# Patient Record
Sex: MALE | HISPANIC OR LATINO
[De-identification: names, ages, dates, MRNs, and addresses within clinical notes are randomized per-mention and may not be internally consistent; named-entity substitution may affect disease eponyms.]

---

## 2021-11-03 PROBLEM — Z00.00 ENCOUNTER FOR PREVENTIVE HEALTH EXAMINATION: Status: ACTIVE | Noted: 2021-11-03

## 2021-11-10 ENCOUNTER — APPOINTMENT (OUTPATIENT)
Dept: UROLOGY | Facility: CLINIC | Age: 66
End: 2021-11-10
Payer: MEDICARE

## 2021-11-10 PROCEDURE — 99204 OFFICE O/P NEW MOD 45 MIN: CPT

## 2021-11-10 NOTE — HISTORY OF PRESENT ILLNESS
[FreeTextEntry1] : This is a 65 year male who has history of diabetes presents for urgency and nocturia\par and erectile dysfunction\par \par Vardenafil for ED -- has already tried viagra and cialis with mixed results \par \par Nov 2021=\par  IPSS -- 7 = mild symptoms\par IIEF = 7\par \par new results\par OCt 2021\par Testosterone -- 328\par A1c - 6.1\par PSA 0.67

## 2021-11-18 LAB
APPEARANCE: CLEAR
BACTERIA UR CULT: NORMAL
BILIRUBIN URINE: NEGATIVE
BLOOD URINE: NEGATIVE
COLOR: YELLOW
GLUCOSE QUALITATIVE U: ABNORMAL
KETONES URINE: NEGATIVE
LEUKOCYTE ESTERASE URINE: NEGATIVE
NITRITE URINE: NEGATIVE
PH URINE: 5.5
PROTEIN URINE: NORMAL
SPECIFIC GRAVITY URINE: 1.02
UROBILINOGEN URINE: NORMAL

## 2022-01-04 ENCOUNTER — NON-APPOINTMENT (OUTPATIENT)
Age: 67
End: 2022-01-04

## 2022-01-12 ENCOUNTER — APPOINTMENT (OUTPATIENT)
Dept: UROLOGY | Facility: CLINIC | Age: 67
End: 2022-01-12
Payer: MEDICARE

## 2022-01-12 VITALS — WEIGHT: 271 LBS | HEIGHT: 72 IN | BODY MASS INDEX: 36.7 KG/M2

## 2022-01-12 PROCEDURE — 99213 OFFICE O/P EST LOW 20 MIN: CPT

## 2022-01-12 RX ORDER — VARDENAFIL 20 MG/1
20 TABLET, FILM COATED ORAL
Qty: 15 | Refills: 5 | Status: ACTIVE | COMMUNITY
Start: 2022-01-12 | End: 1900-01-01

## 2022-01-12 NOTE — HISTORY OF PRESENT ILLNESS
[FreeTextEntry1] : 66 year old male who has history of diabetes presents for urgency and nocturia and erectile dysfunction. \par \par Patient reports that his ED has improved with Levitra since his recent Hernia Surgery in 11/2021. Patient was to trial Trimix at today's visit but declines as the pills are working "moderately well." \par \par Urinary symptoms include Urgency. Patient Urine Culture done 11/2021 reveals no growth. Patient had a bladder US done 01/2022 which revealed a 24 g prostate, prevoid 899 cc, and PVR of 143 cc. \par On Ultrasound there is also 2 fluid collections seen in pelvis, biggest being 9 cm in the RLQ. A CT scan was ordered and patient is waiting for authorization to scheduled CT scan. Patient denies any history of fevers. \par \par PSA 10/2021- 0.67 ng/mL\par Total T 10/2021- 328 ng/dL

## 2022-01-12 NOTE — PHYSICAL EXAM
[General Appearance - In No Acute Distress] : no acute distress [Abdomen Soft] : soft [Abdomen Tenderness] : non-tender [] : no respiratory distress [Oriented To Time, Place, And Person] : oriented to person, place, and time [Normal Station and Gait] : the gait and station were normal for the patient's age [FreeTextEntry1] : No fluid collections palpated.

## 2022-01-12 NOTE — ASSESSMENT
[FreeTextEntry1] : Patient 65 year old followed for ED and Urinary Urgency. \par Patient wishes to continue Vardenafil. Denies history of heart attack. Denies taking any nitroglycerine medications. \par Declined trial of Trimix this visit. \par \par As for Urinary Urgency, patient does not want to take any medication for urinary symptoms at this time. I reviewed US with patient and he is aware he left over 100 cc of urine in his bladder. Patient is also aware of fluid collections and the importance of CT scan to differentiate. \par \par Patient is aware of Testosterone value. He is aware of risks, benefits to testosterone replacement. Given value and T in 10/2021 will hold off on testosterone therapy. Patient will increase exercise and follow up in 1 year with repeat blood work. \par \par Plan\par -Follow up 3-4 weeks to review CT scan results. \par -Follow up 1 year with PSA and Total Testosterone \par -Renew Vardenafil \par

## 2022-02-14 ENCOUNTER — APPOINTMENT (OUTPATIENT)
Dept: UROLOGY | Facility: CLINIC | Age: 67
End: 2022-02-14
Payer: MEDICARE

## 2022-02-14 DIAGNOSIS — R93.89 ABNORMAL FINDINGS ON DIAGNOSTIC IMAGING OF OTHER SPECIFIED BODY STRUCTURES: ICD-10-CM

## 2022-02-14 PROCEDURE — 99214 OFFICE O/P EST MOD 30 MIN: CPT | Mod: 95

## 2022-02-14 NOTE — HISTORY OF PRESENT ILLNESS
[FreeTextEntry1] : TELEMEDICINE -- [facility] FOR FOLLOW UP APPOINTMENT\par \par The patient-doctor relationship has been established in a face to face fashion via real time video/audio HIPAA compliant communication using telemedicine software. The patient's identity has been confirmed. The patient was previously emailed a copy of the telemedicine consent. They have had a chance to review and has now given verbal consent and has requested care to be assessed and treated through telemedicine and understands there maybe limitations in this process and they may need further follow up care in the office and or hospital settings.\par \par The patient denies fevers, chills, nausea and or vomiting and no unexplained weight loss.\par \par All pertinent parts of the patient PFSH (past medical, family and social histories), laboratory, radiological studies and physician notes were reviewed prior to starting the face to face portion of the telemedicine visit. Questionnaire results were discussed with patient.\par \par ==================================================================================================== \par \par Patient 66 year old followed for ED and Urinary Urgency. \par Patient wishes to continue Vardenafil. Denies history of heart attack. Denies taking any nitroglycerine medications. \par Declined trial of Trimix this visit. \par \par As for Urinary Urgency, patient does not want to take any medication for urinary symptoms at this time. I reviewed US with patient and he is aware he left over 100 cc of urine in his bladder. Patient is also aware of fluid collections and the importance of CT scan to differentiate -- hernia surgery Nov 2021\par \par Patient is aware of Testosterone value. He is aware of risks, benefits to testosterone replacement. Given value and T in 10/2021 will hold off on testosterone therapy. Patient will increase exercise and follow up in 1 year with repeat blood work. \par \par -Follow up 1 year with PSA and Total Testosterone \par \par Started: Vardenafil HCl - 20 MG Oral Tablet; - reports not working very well\par \par new labs since last visit\par Jan 2022\par CT -- bilateral inguinal collections -- ?related to hernia operation\par \par Oct 2021\par Testosterone -- 328\par PSA -0.67

## 2022-02-14 NOTE — ASSESSMENT
[FreeTextEntry1] : \par Patient 66 year old followed for ED and Urinary Urgency. \par Patient wishes to continue Vardenafil. Denies history of heart attack. Denies taking any nitroglycerine medications. \par Declined trial of Trimix this visit. \par \par As for Urinary Urgency, patient does not want to take any medication for urinary symptoms at this time. I reviewed US with patient and he is aware he left over 100 cc of urine in his bladder. Patient is also aware of fluid collections and the importance of CT scan to differentiate -- hernia surgery Nov 2021\par \par Patient is aware of Testosterone value. He is aware of risks, benefits to testosterone replacement. Given value and T in 10/2021 will hold off on testosterone therapy. Patient will increase exercise and follow up in 1 year with repeat blood work. \par \par -Follow up 1 year with PSA and Total Testosterone \par \par Started: Vardenafil HCl - 20 MG Oral Tablet; - reports not working very well\par \par new labs since last visit\par Jan 2022\par CT -- bilateral inguinal collections -- ?related to hernia operation\par \par Oct 2021\par Testosterone -- 328\par PSA -0.67

## 2023-01-18 ENCOUNTER — APPOINTMENT (OUTPATIENT)
Dept: UROLOGY | Facility: CLINIC | Age: 68
End: 2023-01-18

## 2023-02-15 ENCOUNTER — APPOINTMENT (OUTPATIENT)
Dept: UROLOGY | Facility: CLINIC | Age: 68
End: 2023-02-15
Payer: MEDICARE

## 2023-02-15 VITALS
TEMPERATURE: 98 F | HEIGHT: 72 IN | BODY MASS INDEX: 36.7 KG/M2 | DIASTOLIC BLOOD PRESSURE: 83 MMHG | WEIGHT: 271 LBS | SYSTOLIC BLOOD PRESSURE: 127 MMHG

## 2023-02-15 PROCEDURE — 99213 OFFICE O/P EST LOW 20 MIN: CPT

## 2023-02-15 RX ORDER — PAPAVERINE HYDROCHLORIDE 30 MG/ML
30 INJECTION, SOLUTION INTRAVENOUS
Qty: 0.5 | Refills: 0 | Status: ACTIVE | COMMUNITY
Start: 2023-02-15 | End: 1900-01-01

## 2023-02-15 NOTE — ASSESSMENT
[FreeTextEntry1] : Patient is a 67-year-old followed for erectile dysfunction and urinary urgency.\par \par Erectile dysfunction is refractory to oral medications and patient requested trial of Trimix.\par \par Urinary symptoms are stable.  Patient has no complaints at this visit.\par \par PSA in 2021 was 0.67.  He is due for repeat PSA.\par \par Plan\par -Follow-up 4 to 6 weeks for Trimix trial\par -PSA prior

## 2023-02-15 NOTE — HISTORY OF PRESENT ILLNESS
[FreeTextEntry1] : Kai is a 67-year-old with history of erectile dysfunction and urinary urgency.\par He is currently on vardenafil for ED without improvement in erections.  Patient request to schedule a visit to trial Trimix.\par \par Patient states that since his last visit his urinary symptoms have been stable.  He denies any difficulties urinating.  He denies dysuria and gross hematuria.\par \par He has history of bladder sonogram which showed PVR of 100 cc and pelvic fluid collections.  Follow-up CT scan revealed bilateral inguinal conductions which were possibly related to previous hernia procedure.\par \par PSA in 2021 was 0.67 ng/mL\par Total testosterone in 2021 was 328.

## 2023-03-22 ENCOUNTER — APPOINTMENT (OUTPATIENT)
Dept: UROLOGY | Facility: CLINIC | Age: 68
End: 2023-03-22
Payer: MEDICARE

## 2023-03-22 VITALS
BODY MASS INDEX: 36.7 KG/M2 | TEMPERATURE: 97.5 F | HEART RATE: 75 BPM | WEIGHT: 271 LBS | OXYGEN SATURATION: 98 % | HEIGHT: 72 IN | RESPIRATION RATE: 16 BRPM | SYSTOLIC BLOOD PRESSURE: 125 MMHG | DIASTOLIC BLOOD PRESSURE: 80 MMHG

## 2023-03-22 DIAGNOSIS — R35.0 FREQUENCY OF MICTURITION: ICD-10-CM

## 2023-03-22 DIAGNOSIS — R35.1 NOCTURIA: ICD-10-CM

## 2023-03-22 PROCEDURE — 99215 OFFICE O/P EST HI 40 MIN: CPT

## 2023-03-31 PROBLEM — R35.0 URINE FREQUENCY: Status: ACTIVE | Noted: 2021-11-10

## 2023-03-31 PROBLEM — R35.1 NOCTURIA: Status: ACTIVE | Noted: 2021-11-10

## 2023-03-31 NOTE — ASSESSMENT
[FreeTextEntry1] : Kai is a 67-year-old history of erectile dysfunction and urinary urgency.\par Urinary symptoms have improved since being started on tadalafil 5 mg daily with primary medical doctor.\par PSA returned at 0.8 ng/mL compared to 0.67 ng/mL in 2021.\par \par He presents today for Trimix trial.\par Patient educated on how to properly prepare medication, repair skin, and inject intracavernosal.\par Risks of Trimix were reviewed with patient including priapism, buildup of scar tissue, and bleeding.\par Patient verbalized understanding of risks and wishes to proceed.\par \par Patient injected himself with 0.2 mL of Trimix 30 – 1 – 10 right-sided intracavernosal.  Patient tolerated injection well.  Skin prepped prior to injection and pressure held after injection.\par 15 minutes after injection, patient had significant fullness of the penis.  No firm erection.\par \par After discussion, patient would like to try injections at home.  He is to start with 0.2 mL of Trimix 30 – 1 – 10 intracavernosal.  Patient knows to alternate sides of injection and to not inject more than once every 3 days due to risk of scar tissue formation.  Patient knows to increase dose by 0.05 mL until satisfied with results.  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

## 2023-03-31 NOTE — HISTORY OF PRESENT ILLNESS
[FreeTextEntry1] : Kai is a 67-year-old with history of erectile dysfunction and urinary urgency.\par He was on vardenafil for erectile dysfunction without improvement.  He presents to office today to trial Trimix.\par \par Since his last visit his medical doctor has started him on daily Cialis 5 mg for lower urinary tract symptoms.  Patient states that this has significantly improved his flow and he is satisfied with this medication.  He denies any dysuria and gross hematuria.\par \par Patient recently had PSA done March 20, 2023.  Result as 0.8 ng/mL with 50% free.\par \par He has history of bladder sonogram which showed PVR of 100 cc and pelvic fluid collections. Follow-up CT scan revealed bilateral inguinal conductions which were possibly related to previous hernia procedure.

## 2023-03-31 NOTE — PHYSICAL EXAM
[General Appearance - In No Acute Distress] : no acute distress [] : no respiratory distress [Oriented To Time, Place, And Person] : oriented to person, place, and time [Normal Station and Gait] : the gait and station were normal for the patient's age [No Focal Deficits] : no focal deficits [FreeTextEntry1] : Psoriatic rash on glans of penis.

## 2023-03-31 NOTE — REVIEW OF SYSTEMS
[see HPI] : see HPI [Chills] : no chills [Fever] : no fever [Chest Pain] : no chest pain [Abdominal Pain] : no abdominal pain [Shortness Of Breath] : no shortness of breath [Vomiting] : no vomiting

## 2023-05-24 ENCOUNTER — APPOINTMENT (OUTPATIENT)
Dept: UROLOGY | Facility: CLINIC | Age: 68
End: 2023-05-24
Payer: MEDICARE

## 2023-05-24 VITALS
DIASTOLIC BLOOD PRESSURE: 71 MMHG | WEIGHT: 269.06 LBS | SYSTOLIC BLOOD PRESSURE: 121 MMHG | HEIGHT: 72 IN | TEMPERATURE: 98 F | BODY MASS INDEX: 36.44 KG/M2 | HEART RATE: 65 BPM

## 2023-05-24 PROCEDURE — 99213 OFFICE O/P EST LOW 20 MIN: CPT

## 2023-05-24 NOTE — HISTORY OF PRESENT ILLNESS
[FreeTextEntry1] : \par Kai is a 67-year-old history of erectile dysfunction and urinary urgency.\par Urinary symptoms have improved since being started on tadalafil 5 mg daily with primary medical doctor.\par PSA returned at 0.8 ng/mL in March 2023 compared to 0.67 ng/mL in 2021.\par \par Patient injected himself with 0.2 mL of Trimix 30 – 1 – 10 right-sided intracavernosal. Patient tolerated injection well. Skin prepped prior to injection and pressure held after injection.\par 15 minutes after injection, patient had significant fullness of the penis. No firm erection.\par \par very happy with trimix at home on 0.25\par \par flow is better on tadalafil 5mg daily but pmd\par \par ermatologist for psoriasis of the penis.  this has now resolved \par

## 2024-05-29 ENCOUNTER — APPOINTMENT (OUTPATIENT)
Dept: UROLOGY | Facility: CLINIC | Age: 69
End: 2024-05-29

## 2024-05-31 ENCOUNTER — APPOINTMENT (OUTPATIENT)
Dept: UROLOGY | Facility: CLINIC | Age: 69
End: 2024-05-31
Payer: MEDICARE

## 2024-05-31 VITALS
OXYGEN SATURATION: 97 % | HEIGHT: 72 IN | DIASTOLIC BLOOD PRESSURE: 75 MMHG | BODY MASS INDEX: 36.03 KG/M2 | WEIGHT: 266 LBS | HEART RATE: 69 BPM | RESPIRATION RATE: 18 BRPM | SYSTOLIC BLOOD PRESSURE: 148 MMHG

## 2024-05-31 DIAGNOSIS — N52.9 MALE ERECTILE DYSFUNCTION, UNSPECIFIED: ICD-10-CM

## 2024-05-31 DIAGNOSIS — Z12.5 ENCOUNTER FOR SCREENING FOR MALIGNANT NEOPLASM OF PROSTATE: ICD-10-CM

## 2024-05-31 PROCEDURE — G2211 COMPLEX E/M VISIT ADD ON: CPT

## 2024-05-31 PROCEDURE — 99213 OFFICE O/P EST LOW 20 MIN: CPT

## 2024-05-31 RX ORDER — PAPAVERINE HYDROCHLORIDE 30 MG/ML
30 INJECTION, SOLUTION INTRAVENOUS
Qty: 5 | Refills: 5 | Status: ACTIVE | COMMUNITY
Start: 2023-03-22 | End: 1900-01-01

## 2024-05-31 RX ORDER — DAPAGLIFLOZIN 5 MG/1
5 TABLET, FILM COATED ORAL
Refills: 0 | Status: ACTIVE | COMMUNITY

## 2024-05-31 NOTE — HISTORY OF PRESENT ILLNESS
[FreeTextEntry1] : Kai is a 67-year-old history of erectile dysfunction and urinary urgency. Urinary symptoms have improved since being started on tadalafil 5 mg daily with primary medical doctor.  PSA returned at 0.8 ng/mL in March 2023 compared to 0.67 ng/mL in 2021.  Patient injected himself with 0.2 mL of Trimix 30 - 1 - 10 right-sided intracavernosal. Patient tolerated injection well. Skin prepped prior to injection and pressure held after injection. 15 minutes after injection, patient had significant fullness of the penis. No firm erection.  very happy with trimix at home on 0.25 flow is better on tadalafil 5mg daily but pmd  Dermatologist for psoriasis of the penis. this has now resolved  Office visit 5/31/24 Patient presents today with no new acute complaints.  He has been taking Trimix 0.25 cc.  He is satisfied with his erectile function at this dose.  He is also taking tadalafil 5 mg daily for his lower urinary tract symptoms as prescribed by his primary medical provider.  The patient denies having any fevers, chills, nausea, vomiting, flank/abdominal pain or any irritative voiding symptoms.

## 2024-05-31 NOTE — PLAN

## 2024-05-31 NOTE — ASSESSMENT
[FreeTextEntry1] : #PSA screening - PSA 0.8 3/2023 - PSA  today Today I discussed the risks and benefits of PSA screening. There are a number of benign conditions including UTI, BPH, epstein catheter, certain activities and prostatitis that will increase PSA in the absence of cancer. Unfortunately, the only way to definitively diagnose cancer is with a prostate biopsy. I discussed the method of performing biopsy (transperineal with anesthesia) and the risks (bleeding, infection, urinary retention, ED). In addition to this, the patient and I discussed the discrepancy between the prevalence of prostate cancer and the prevalence of death from prostate cancer.  Prostate cancer is the most common solid tumor in [place] men. However, I mentioned how it can be very slow growing, many men with prostate cancer will die with the disease rather than from it. Referring to the AUA Early detection Guidelines of 2023, Age based PSA cutoffs to avoid under diagnosis in young patients and over diagnosis in older patients are as follows according to the discussion in statement 3: 2.5 ng/mL for people in their 40s, 3.5 ng/mL for people in their 50s, 4.5 ng/mL for people in their 60s, and 6.5 ng/mL for people in their 70s - Guideline statement 3 discussion  Patient will return in 12 months to reassess his symptoms or sooner pending lab results.  #ED/LUTS - Trimix on 0.25 - c/w tadalafil 5mg daily - as prescribed by his PMD Erectile dysfunction, its etiology, risk factors and natural history were discussed with the patient. Work-up and empiric management were discussed. From least to most invasive, treatments including behavioral changes (weight loss, exercise, improved sleep), oral PDE5i, vacuum erection device, intraurethral pellets, intracavernosal injections, and penile prosthetic implantation were described. Relevant individual risks and benefits disclosed. I explained that there are different causes for ED including psychogenic, vasculogenic, neurogenic and medication side-effect related causes. Oftentimes there are multiple causes.   The patient understands that the risks of PDE5is include facial redness, flushing, GERD, back pain, priapism, chest pain/MI, arrhythmia, dizziness, drop in BP, impaired vision and loss of hearing. He understands that the medication may take up to an hour to function and requires sexual stimulation. He was told not to take his medication within 4 hours of alpha blockers, or not at all if ever taking nitroglycerin. Both sildenafil and vardenafil, but not tadalafil, have some cross-reactivity with PDE6 and thus may produce visual side effects. He also was told to go to the ER immediately if he noticed any blindness or visual changes, or for any painful erection lasting > 4 hrs. He denies any history nitrate medication use for angina.

## 2024-06-02 LAB
PSA FREE FLD-MCNC: 35 %
PSA FREE SERPL-MCNC: 0.23 NG/ML
PSA SERPL-MCNC: 0.64 NG/ML

## 2025-08-01 ENCOUNTER — APPOINTMENT (OUTPATIENT)
Dept: UROLOGY | Facility: CLINIC | Age: 70
End: 2025-08-01
Payer: MEDICARE

## 2025-08-01 DIAGNOSIS — N52.9 MALE ERECTILE DYSFUNCTION, UNSPECIFIED: ICD-10-CM

## 2025-08-01 DIAGNOSIS — Z12.5 ENCOUNTER FOR SCREENING FOR MALIGNANT NEOPLASM OF PROSTATE: ICD-10-CM

## 2025-08-01 PROCEDURE — G2211 COMPLEX E/M VISIT ADD ON: CPT

## 2025-08-01 PROCEDURE — 99213 OFFICE O/P EST LOW 20 MIN: CPT
